# Patient Record
Sex: MALE | Race: WHITE | NOT HISPANIC OR LATINO | Employment: FULL TIME | URBAN - NONMETROPOLITAN AREA
[De-identification: names, ages, dates, MRNs, and addresses within clinical notes are randomized per-mention and may not be internally consistent; named-entity substitution may affect disease eponyms.]

---

## 2018-11-13 ENCOUNTER — NON-PROVIDER VISIT (OUTPATIENT)
Dept: MEDICAL GROUP | Facility: CLINIC | Age: 48
End: 2018-11-13

## 2018-11-13 DIAGNOSIS — Z02.83 ENCOUNTER FOR DRUG SCREENING: ICD-10-CM

## 2018-11-13 PROCEDURE — 8907 PR URINE COLLECT ONLY: Performed by: FAMILY MEDICINE

## 2018-11-13 NOTE — NON-PROVIDER
Mina Hammond is a 48 y.o. male here for a non-provider visit for UDS Collect Only - Random - [employer]    If abnormal was an in office provider notified today (if so, indicate provider)? Yes  Routed to PCP? Yes